# Patient Record
Sex: FEMALE | Race: WHITE
[De-identification: names, ages, dates, MRNs, and addresses within clinical notes are randomized per-mention and may not be internally consistent; named-entity substitution may affect disease eponyms.]

---

## 2018-06-03 ENCOUNTER — HOSPITAL ENCOUNTER (INPATIENT)
Dept: HOSPITAL 94 - ER | Age: 83
LOS: 4 days | Discharge: NURSING, REHAB OR LONG-TERM CARE | DRG: 280 | End: 2018-06-07
Attending: FAMILY MEDICINE | Admitting: INTERNAL MEDICINE
Payer: MEDICARE

## 2018-06-03 VITALS — WEIGHT: 175.38 LBS | BODY MASS INDEX: 27.53 KG/M2 | HEIGHT: 67 IN

## 2018-06-03 VITALS — DIASTOLIC BLOOD PRESSURE: 41 MMHG | SYSTOLIC BLOOD PRESSURE: 116 MMHG

## 2018-06-03 DIAGNOSIS — N18.9: ICD-10-CM

## 2018-06-03 DIAGNOSIS — I13.0: ICD-10-CM

## 2018-06-03 DIAGNOSIS — I50.9: ICD-10-CM

## 2018-06-03 DIAGNOSIS — I21.4: Primary | ICD-10-CM

## 2018-06-03 DIAGNOSIS — I25.10: ICD-10-CM

## 2018-06-03 DIAGNOSIS — L89.329: ICD-10-CM

## 2018-06-03 DIAGNOSIS — Z66: ICD-10-CM

## 2018-06-03 DIAGNOSIS — Z51.5: ICD-10-CM

## 2018-06-03 DIAGNOSIS — Z90.710: ICD-10-CM

## 2018-06-03 DIAGNOSIS — F03.90: ICD-10-CM

## 2018-06-03 DIAGNOSIS — Z79.899: ICD-10-CM

## 2018-06-03 DIAGNOSIS — Z98.61: ICD-10-CM

## 2018-06-03 DIAGNOSIS — N17.9: ICD-10-CM

## 2018-06-03 DIAGNOSIS — J18.9: ICD-10-CM

## 2018-06-03 DIAGNOSIS — G89.29: ICD-10-CM

## 2018-06-03 DIAGNOSIS — Z79.02: ICD-10-CM

## 2018-06-03 DIAGNOSIS — I35.0: ICD-10-CM

## 2018-06-03 DIAGNOSIS — Z87.440: ICD-10-CM

## 2018-06-03 DIAGNOSIS — J96.01: ICD-10-CM
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
## 2018-06-03 DIAGNOSIS — M54.9: ICD-10-CM

## 2018-06-03 LAB
ALBUMIN SERPL BCP-MCNC: 2.7 G/DL (ref 3.4–5)
ALBUMIN/GLOB SERPL: 0.6 {RATIO} (ref 1.1–1.5)
ALP SERPL-CCNC: 336 IU/L (ref 46–116)
ALT SERPL W P-5'-P-CCNC: 23 U/L (ref 12–78)
ANION GAP SERPL CALCULATED.3IONS-SCNC: 13 MMOL/L (ref 8–16)
APTT PPP: 29 SECONDS (ref 22–32)
AST SERPL W P-5'-P-CCNC: 41 U/L (ref 10–37)
BASOPHILS # BLD AUTO: 0 X10'3 (ref 0–0.2)
BASOPHILS NFR BLD AUTO: 0 % (ref 0–1)
BILIRUB SERPL-MCNC: 1.5 MG/DL (ref 0.1–1)
BUN SERPL-MCNC: 36 MG/DL (ref 7–18)
BUN/CREAT SERPL: 24.3 (ref 6.6–38)
CALCIUM SERPL-MCNC: 9.6 MG/DL (ref 8.5–10.1)
CHLORIDE SERPL-SCNC: 101 MMOL/L (ref 99–107)
CO2 SERPL-SCNC: 23.9 MMOL/L (ref 24–32)
CREAT SERPL-MCNC: 1.48 MG/DL (ref 0.4–0.9)
EOSINOPHIL # BLD AUTO: 0.1 X10'3 (ref 0–0.9)
EOSINOPHIL NFR BLD AUTO: 0.6 % (ref 0–6)
ERYTHROCYTE [DISTWIDTH] IN BLOOD BY AUTOMATED COUNT: 14.1 % (ref 11.5–14.5)
GFR SERPL CREATININE-BSD FRML MDRD: 34 ML/MIN
GLUCOSE SERPL-MCNC: 114 MG/DL (ref 70–104)
HCT VFR BLD AUTO: 30.7 % (ref 35–45)
HGB BLD-MCNC: 10.6 G/DL (ref 12–16)
INR PPP: 1 INR
LYMPHOCYTES # BLD AUTO: 0.7 X10'3 (ref 1.1–4.8)
LYMPHOCYTES NFR BLD AUTO: 4.4 % (ref 21–51)
MAGNESIUM SERPL-MCNC: 2.2 MG/DL (ref 1.5–2.4)
MCH RBC QN AUTO: 30 PG (ref 27–31)
MCHC RBC AUTO-ENTMCNC: 34.4 % (ref 33–36.5)
MCV RBC AUTO: 87.1 FL (ref 78–98)
MONOCYTES # BLD AUTO: 0.9 X10'3 (ref 0–0.9)
MONOCYTES NFR BLD AUTO: 5.4 % (ref 2–12)
NEUTROPHILS # BLD AUTO: 15 X10'3 (ref 1.8–7.7)
NEUTROPHILS NFR BLD AUTO: 89.6 % (ref 42–75)
PLATELET # BLD AUTO: 304 X10'3 (ref 140–440)
PMV BLD AUTO: 7.9 FL (ref 7.4–10.4)
POTASSIUM SERPL-SCNC: 4.6 MMOL/L (ref 3.5–5.1)
PROT SERPL-MCNC: 7.5 G/DL (ref 6.4–8.2)
PROTHROMBIN TIME: 10.5 SECONDS (ref 9–12)
RBC # BLD AUTO: 3.53 X10'6 (ref 4.2–5.6)
SODIUM SERPL-SCNC: 138 MMOL/L (ref 135–145)
WBC # BLD AUTO: 16.8 X10'3 (ref 4.5–11)

## 2018-06-03 PROCEDURE — 36415 COLL VENOUS BLD VENIPUNCTURE: CPT

## 2018-06-03 PROCEDURE — 87070 CULTURE OTHR SPECIMN AEROBIC: CPT

## 2018-06-03 PROCEDURE — 80061 LIPID PANEL: CPT

## 2018-06-03 PROCEDURE — 94640 AIRWAY INHALATION TREATMENT: CPT

## 2018-06-03 PROCEDURE — 87040 BLOOD CULTURE FOR BACTERIA: CPT

## 2018-06-03 PROCEDURE — 83880 ASSAY OF NATRIURETIC PEPTIDE: CPT

## 2018-06-03 PROCEDURE — 85610 PROTHROMBIN TIME: CPT

## 2018-06-03 PROCEDURE — 80048 BASIC METABOLIC PNL TOTAL CA: CPT

## 2018-06-03 PROCEDURE — 94760 N-INVAS EAR/PLS OXIMETRY 1: CPT

## 2018-06-03 PROCEDURE — 99291 CRITICAL CARE FIRST HOUR: CPT

## 2018-06-03 PROCEDURE — 83735 ASSAY OF MAGNESIUM: CPT

## 2018-06-03 PROCEDURE — 85730 THROMBOPLASTIN TIME PARTIAL: CPT

## 2018-06-03 PROCEDURE — 71045 X-RAY EXAM CHEST 1 VIEW: CPT

## 2018-06-03 PROCEDURE — 80053 COMPREHEN METABOLIC PANEL: CPT

## 2018-06-03 PROCEDURE — 85025 COMPLETE CBC W/AUTO DIFF WBC: CPT

## 2018-06-03 PROCEDURE — 83605 ASSAY OF LACTIC ACID: CPT

## 2018-06-03 PROCEDURE — 93005 ELECTROCARDIOGRAM TRACING: CPT

## 2018-06-03 PROCEDURE — 84484 ASSAY OF TROPONIN QUANT: CPT

## 2018-06-03 RX ADMIN — IPRATROPIUM BROMIDE AND ALBUTEROL SULFATE SCH ML: .5; 3 SOLUTION RESPIRATORY (INHALATION) at 23:00

## 2018-06-03 RX ADMIN — CEFEPIME SCH MLS/HR: 1 INJECTION, SOLUTION INTRAVENOUS at 20:56

## 2018-06-03 RX ADMIN — IPRATROPIUM BROMIDE AND ALBUTEROL SULFATE SCH ML: .5; 3 SOLUTION RESPIRATORY (INHALATION) at 19:00

## 2018-06-04 VITALS — DIASTOLIC BLOOD PRESSURE: 43 MMHG | SYSTOLIC BLOOD PRESSURE: 123 MMHG

## 2018-06-04 LAB
ALBUMIN SERPL BCP-MCNC: 2.5 G/DL (ref 3.4–5)
ANION GAP SERPL CALCULATED.3IONS-SCNC: 15 MMOL/L (ref 8–16)
BASOPHILS # BLD AUTO: 0 X10'3 (ref 0–0.2)
BASOPHILS NFR BLD AUTO: 0 % (ref 0–1)
BUN SERPL-MCNC: 45 MG/DL (ref 7–18)
BUN/CREAT SERPL: 36.9 (ref 6.6–38)
CALCIUM SERPL-MCNC: 9.7 MG/DL (ref 8.5–10.1)
CHLORIDE SERPL-SCNC: 103 MMOL/L (ref 99–107)
CHOLEST SERPL-MCNC: 175 MG/DL (ref 0–200)
CHOLEST/HDLC SERPL: 5.6 {RATIO} (ref 0–4.99)
CO2 SERPL-SCNC: 23 MMOL/L (ref 24–32)
CREAT SERPL-MCNC: 1.22 MG/DL (ref 0.4–0.9)
EOSINOPHIL # BLD AUTO: 0 X10'3 (ref 0–0.9)
EOSINOPHIL NFR BLD AUTO: 0 % (ref 0–6)
ERYTHROCYTE [DISTWIDTH] IN BLOOD BY AUTOMATED COUNT: 13.9 % (ref 11.5–14.5)
GFR SERPL CREATININE-BSD FRML MDRD: 42 ML/MIN
GLUCOSE SERPL-MCNC: 146 MG/DL (ref 70–104)
HCT VFR BLD AUTO: 29.6 % (ref 35–45)
HDLC SERPL-MCNC: 31 MG/DL (ref 35–60)
HGB BLD-MCNC: 10.1 G/DL (ref 12–16)
LDLC SERPL DIRECT ASSAY-MCNC: 112 MG/DL (ref 50–100)
LYMPHOCYTES # BLD AUTO: 0.4 X10'3 (ref 1.1–4.8)
LYMPHOCYTES NFR BLD AUTO: 2.9 % (ref 21–51)
MCH RBC QN AUTO: 29.9 PG (ref 27–31)
MCHC RBC AUTO-ENTMCNC: 34.2 % (ref 33–36.5)
MCV RBC AUTO: 87.6 FL (ref 78–98)
MONOCYTES # BLD AUTO: 0.1 X10'3 (ref 0–0.9)
MONOCYTES NFR BLD AUTO: 0.8 % (ref 2–12)
NEUTROPHILS # BLD AUTO: 14.6 X10'3 (ref 1.8–7.7)
NEUTROPHILS NFR BLD AUTO: 96.3 % (ref 42–75)
PLATELET # BLD AUTO: 313 X10'3 (ref 140–440)
PMV BLD AUTO: 7.9 FL (ref 7.4–10.4)
POTASSIUM SERPL-SCNC: 3.9 MMOL/L (ref 3.5–5.1)
RBC # BLD AUTO: 3.38 X10'6 (ref 4.2–5.6)
SODIUM SERPL-SCNC: 141 MMOL/L (ref 135–145)
TRIGL SERPL-MCNC: 138 MG/DL (ref 20–135)
WBC # BLD AUTO: 15.2 X10'3 (ref 4.5–11)

## 2018-06-04 RX ADMIN — ENOXAPARIN SODIUM SCH MG: 100 INJECTION SUBCUTANEOUS at 20:00

## 2018-06-04 RX ADMIN — Medication SCH MMU: at 20:00

## 2018-06-04 RX ADMIN — Medication SCH MMU: at 08:00

## 2018-06-04 RX ADMIN — Medication SCH MG: at 10:00

## 2018-06-04 RX ADMIN — CEFEPIME SCH MLS/HR: 1 INJECTION, SOLUTION INTRAVENOUS at 08:00

## 2018-06-05 VITALS — SYSTOLIC BLOOD PRESSURE: 133 MMHG | DIASTOLIC BLOOD PRESSURE: 48 MMHG

## 2018-06-05 VITALS — SYSTOLIC BLOOD PRESSURE: 140 MMHG | DIASTOLIC BLOOD PRESSURE: 58 MMHG

## 2018-06-05 RX ADMIN — Medication SCH MMU: at 08:00

## 2018-06-05 RX ADMIN — Medication SCH MG: at 08:22

## 2018-06-05 RX ADMIN — HYDROCODONE BITARTRATE AND ACETAMINOPHEN PRN TAB: 5; 325 TABLET ORAL at 23:59

## 2018-06-05 RX ADMIN — Medication SCH MMU: at 20:00

## 2018-06-05 RX ADMIN — HYDROCODONE BITARTRATE AND ACETAMINOPHEN PRN TAB: 5; 325 TABLET ORAL at 19:52

## 2018-06-05 RX ADMIN — ENOXAPARIN SODIUM SCH MG: 100 INJECTION SUBCUTANEOUS at 08:00

## 2018-06-05 RX ADMIN — ENOXAPARIN SODIUM SCH MG: 100 INJECTION SUBCUTANEOUS at 20:00

## 2018-06-06 VITALS — DIASTOLIC BLOOD PRESSURE: 46 MMHG | SYSTOLIC BLOOD PRESSURE: 119 MMHG

## 2018-06-06 VITALS — SYSTOLIC BLOOD PRESSURE: 126 MMHG | DIASTOLIC BLOOD PRESSURE: 46 MMHG

## 2018-06-06 VITALS — SYSTOLIC BLOOD PRESSURE: 138 MMHG | DIASTOLIC BLOOD PRESSURE: 53 MMHG

## 2018-06-06 VITALS — DIASTOLIC BLOOD PRESSURE: 51 MMHG | SYSTOLIC BLOOD PRESSURE: 126 MMHG

## 2018-06-06 RX ADMIN — HYDROCODONE BITARTRATE AND ACETAMINOPHEN PRN TAB: 5; 325 TABLET ORAL at 10:48

## 2018-06-06 RX ADMIN — ENOXAPARIN SODIUM SCH MG: 100 INJECTION SUBCUTANEOUS at 08:00

## 2018-06-06 RX ADMIN — Medication SCH MMU: at 20:00

## 2018-06-06 RX ADMIN — HYDROCODONE BITARTRATE AND ACETAMINOPHEN PRN TAB: 5; 325 TABLET ORAL at 23:39

## 2018-06-06 RX ADMIN — ENOXAPARIN SODIUM SCH MG: 100 INJECTION SUBCUTANEOUS at 20:00

## 2018-06-06 RX ADMIN — Medication SCH MMU: at 08:00

## 2018-06-06 RX ADMIN — Medication SCH MG: at 08:30

## 2018-06-07 VITALS — SYSTOLIC BLOOD PRESSURE: 140 MMHG | DIASTOLIC BLOOD PRESSURE: 46 MMHG

## 2018-06-07 VITALS — DIASTOLIC BLOOD PRESSURE: 45 MMHG | SYSTOLIC BLOOD PRESSURE: 113 MMHG

## 2018-06-07 RX ADMIN — Medication SCH MG: at 08:24

## 2018-06-07 RX ADMIN — ENOXAPARIN SODIUM SCH MG: 100 INJECTION SUBCUTANEOUS at 08:00

## 2018-06-07 RX ADMIN — Medication SCH MMU: at 08:00

## 2020-04-27 ENCOUNTER — HOSPITAL ENCOUNTER (EMERGENCY)
Dept: HOSPITAL 94 - ER | Age: 85
Discharge: HOME | End: 2020-04-27
Payer: MEDICARE

## 2020-04-27 VITALS — BODY MASS INDEX: 30.38 KG/M2 | HEIGHT: 68 IN | WEIGHT: 200.42 LBS

## 2020-04-27 VITALS — SYSTOLIC BLOOD PRESSURE: 156 MMHG | DIASTOLIC BLOOD PRESSURE: 93 MMHG

## 2020-04-27 DIAGNOSIS — G89.29: ICD-10-CM

## 2020-04-27 DIAGNOSIS — I25.10: ICD-10-CM

## 2020-04-27 DIAGNOSIS — Z79.899: ICD-10-CM

## 2020-04-27 DIAGNOSIS — I70.201: Primary | ICD-10-CM

## 2020-04-27 DIAGNOSIS — Z90.710: ICD-10-CM

## 2020-04-27 DIAGNOSIS — I99.8: ICD-10-CM

## 2020-04-27 DIAGNOSIS — I35.0: ICD-10-CM

## 2020-04-27 DIAGNOSIS — Z95.5: ICD-10-CM

## 2020-04-27 PROCEDURE — 71045 X-RAY EXAM CHEST 1 VIEW: CPT

## 2020-04-27 PROCEDURE — 93926 LOWER EXTREMITY STUDY: CPT

## 2020-04-27 PROCEDURE — 93005 ELECTROCARDIOGRAM TRACING: CPT

## 2020-04-27 PROCEDURE — 99291 CRITICAL CARE FIRST HOUR: CPT

## 2020-04-27 NOTE — NUR
PT REFUSING CARE, MEDS OR DIAGNOSTICS. PT CURRENTLY BEING SEEN BY PROVIDER 
MISTI DISCUSSING PT WISHES

## 2020-04-27 NOTE — NUR
REPORTED OFF TO ANDREIA FERREIRA FROM [facility] POST ACCUTE AND UPDATED ON PT REFUSAL 
OF TX AND PTS SON AWARE.

## 2020-04-29 ENCOUNTER — HOSPITAL ENCOUNTER (EMERGENCY)
Dept: HOSPITAL 94 - ER | Age: 85
Discharge: LEFT BEFORE DISCHARGE | End: 2020-04-29
Payer: MEDICARE

## 2020-04-29 VITALS — BODY MASS INDEX: 27.55 KG/M2 | HEIGHT: 65 IN | WEIGHT: 165.35 LBS

## 2020-04-29 VITALS — SYSTOLIC BLOOD PRESSURE: 130 MMHG | DIASTOLIC BLOOD PRESSURE: 55 MMHG

## 2020-04-29 DIAGNOSIS — I25.10: ICD-10-CM

## 2020-04-29 DIAGNOSIS — I96: Primary | ICD-10-CM

## 2020-04-29 DIAGNOSIS — M79.671: ICD-10-CM

## 2020-04-29 DIAGNOSIS — G89.29: ICD-10-CM

## 2020-04-29 DIAGNOSIS — Z90.710: ICD-10-CM

## 2020-04-29 DIAGNOSIS — Z60.2: ICD-10-CM

## 2020-04-29 DIAGNOSIS — F03.90: ICD-10-CM

## 2020-04-29 DIAGNOSIS — Z79.899: ICD-10-CM

## 2020-04-29 DIAGNOSIS — M87.078: ICD-10-CM

## 2020-04-29 DIAGNOSIS — Z98.61: ICD-10-CM

## 2020-04-29 PROCEDURE — 99283 EMERGENCY DEPT VISIT LOW MDM: CPT

## 2020-04-29 PROCEDURE — 96372 THER/PROPH/DIAG INJ SC/IM: CPT

## 2020-04-29 SDOH — SOCIAL STABILITY - SOCIAL INSECURITY: PROBLEMS RELATED TO LIVING ALONE: Z60.2
